# Patient Record
Sex: FEMALE | Race: WHITE | URBAN - METROPOLITAN AREA
[De-identification: names, ages, dates, MRNs, and addresses within clinical notes are randomized per-mention and may not be internally consistent; named-entity substitution may affect disease eponyms.]

---

## 2021-06-23 ENCOUNTER — LAB OUTSIDE AN ENCOUNTER (OUTPATIENT)
Dept: URBAN - METROPOLITAN AREA CLINIC 40 | Facility: CLINIC | Age: 66
End: 2021-06-23

## 2021-06-23 ENCOUNTER — OFFICE VISIT (OUTPATIENT)
Dept: URBAN - METROPOLITAN AREA CLINIC 40 | Facility: CLINIC | Age: 66
End: 2021-06-23
Payer: MEDICARE

## 2021-06-23 DIAGNOSIS — R19.7 DIARRHEA: ICD-10-CM

## 2021-06-23 DIAGNOSIS — R10.31 RLQ ABDOMINAL PAIN: ICD-10-CM

## 2021-06-23 PROCEDURE — 99204 OFFICE O/P NEW MOD 45 MIN: CPT | Performed by: INTERNAL MEDICINE

## 2021-06-23 RX ORDER — RISPERIDONE 1 MG/1
AS DIRECTED TABLET ORAL
Status: ACTIVE | COMMUNITY

## 2021-06-23 RX ORDER — DICYCLOMINE HYDROCHLORIDE 10 MG/1
AS DIRECTED CAPSULE ORAL
OUTPATIENT

## 2021-06-23 RX ORDER — SERTRALINE 25 MG/1
AS DIRECTED TABLET, FILM COATED ORAL
Status: ACTIVE | COMMUNITY

## 2021-06-23 RX ORDER — DICYCLOMINE HYDROCHLORIDE 10 MG/1
AS DIRECTED CAPSULE ORAL
Status: ACTIVE | COMMUNITY

## 2021-06-23 RX ORDER — METRONIDAZOLE 500 MG/1
AS DIRECTED TABLET ORAL
Status: ACTIVE | COMMUNITY

## 2021-06-23 RX ORDER — CLONAZEPAM 1 MG/1
AS DIRECTED TABLET ORAL
Status: ACTIVE | COMMUNITY

## 2021-06-23 RX ORDER — ESTROGENS, CONJUGATED 0.3 MG/1
AS DIRECTED TABLET, FILM COATED ORAL
Status: ACTIVE | COMMUNITY

## 2021-06-23 NOTE — HPI-TODAY'S VISIT:
Ms. Lombardo is a 66-year-old white female referred for new patient evaluation by Dr. Rachel Araiza.  Patient has a history of a bladder removal with ileal conduit after a complicated bladder sling surgery in 2014.  She states ever since the surgery she has had issues with fecal incontinence.  An attempt at colonoscopy in 2017 failed.  She states her last complete colonoscopy was 2012 where polyps were removed.  Currently patient is complaining of issues with abdominal discomfort as well as loose stools.  She states the stools are postprandial and 5-6 times a day.  They are occasionally nocturnal.  There is no blood in the stool.  She over the last 2 weeks has had some issues with nausea as well as feeling like the insides were coming out of her rectum.  She is ended up in the  [place] emergency room  june 5th due to the pain becoming severe on the left side and flank. CT scan was done. This showed alimentary canal images within normal limits.  The appendix was surgically absent.  No acute abdominal pelvic process was identified.  There was a left hip effusion noted.  Her urine was consistent with urinary tract infection and she has been on antibiotics for this.  She is not on any probiotics.  She was given dicyclomine by her primary care physician which she states has been helpful but she only takes once a day because of concern about being reliant on pain medications.  She is not noticing any bleeding out of her urostomy bag.  She does have a hernia at the urostomy site.  She notes a decreased appetite.  She also notes that her stool was dark last Wednesday.  She denies any NSAIDs.

## 2021-06-29 LAB
FECAL FAT, QUALITATIVE: (no result)
GASTROINTESTINAL PATHOGEN: (no result)
PANCREATICELASTASE ELISA, STOOL: (no result)
STOOL, WHITE BLOOD CELLS: (no result)

## 2021-07-01 ENCOUNTER — TELEPHONE ENCOUNTER (OUTPATIENT)
Dept: URBAN - METROPOLITAN AREA CLINIC 40 | Facility: CLINIC | Age: 66
End: 2021-07-01

## 2021-07-01 RX ORDER — COLESTIPOL HYDROCHLORIDE 1 G/1
1 TABLET TABLET, FILM COATED ORAL
Qty: 180 | Refills: 1 | OUTPATIENT
Start: 2021-07-01

## 2021-08-03 ENCOUNTER — OFFICE VISIT (OUTPATIENT)
Dept: URBAN - METROPOLITAN AREA CLINIC 40 | Facility: CLINIC | Age: 66
End: 2021-08-03
Payer: MEDICARE

## 2021-08-03 ENCOUNTER — WEB ENCOUNTER (OUTPATIENT)
Dept: URBAN - METROPOLITAN AREA CLINIC 40 | Facility: CLINIC | Age: 66
End: 2021-08-03

## 2021-08-03 ENCOUNTER — OFFICE VISIT (OUTPATIENT)
Dept: URBAN - METROPOLITAN AREA CLINIC 40 | Facility: CLINIC | Age: 66
End: 2021-08-03

## 2021-08-03 DIAGNOSIS — K46.9 PERISTOMAL HERNIA: ICD-10-CM

## 2021-08-03 DIAGNOSIS — R19.7 DIARRHEA: ICD-10-CM

## 2021-08-03 DIAGNOSIS — R93.3 ABNORMAL CT SCAN, COLON: ICD-10-CM

## 2021-08-03 DIAGNOSIS — K58.9 IBS (IRRITABLE BOWEL SYNDROME): ICD-10-CM

## 2021-08-03 PROCEDURE — 99214 OFFICE O/P EST MOD 30 MIN: CPT | Performed by: INTERNAL MEDICINE

## 2021-08-03 RX ORDER — RISPERIDONE 1 MG/1
AS DIRECTED TABLET ORAL
Status: ACTIVE | COMMUNITY

## 2021-08-03 RX ORDER — COLESTIPOL HYDROCHLORIDE 1 G/1
1 TABLET TABLET, FILM COATED ORAL
Qty: 180 | Refills: 1 | Status: ACTIVE | COMMUNITY
Start: 2021-07-01

## 2021-08-03 RX ORDER — ESTROGENS, CONJUGATED 0.3 MG/1
AS DIRECTED TABLET, FILM COATED ORAL
Status: ACTIVE | COMMUNITY

## 2021-08-03 RX ORDER — SERTRALINE 25 MG/1
AS DIRECTED TABLET, FILM COATED ORAL
Status: ACTIVE | COMMUNITY

## 2021-08-03 RX ORDER — COLESTIPOL HYDROCHLORIDE 1 G/1
2 TABLETS TABLET, FILM COATED ORAL
Qty: 360 | Refills: 1 | OUTPATIENT
Start: 2021-08-03

## 2021-08-03 RX ORDER — CLONAZEPAM 1 MG/1
AS DIRECTED TABLET ORAL
Status: ACTIVE | COMMUNITY

## 2021-08-03 RX ORDER — RIFAXIMIN 550 MG/1
1 TABLET TABLET ORAL THREE TIMES A DAY
Qty: 42 TABLET | Refills: 0 | OUTPATIENT
Start: 2021-08-03 | End: 2021-08-17

## 2021-08-03 RX ORDER — COLESTIPOL HYDROCHLORIDE 1 G/1
1 TABLET TABLET, FILM COATED ORAL
OUTPATIENT
Start: 2021-07-01

## 2021-08-03 RX ORDER — DICYCLOMINE HYDROCHLORIDE 10 MG/1
AS DIRECTED CAPSULE ORAL
Status: ACTIVE | COMMUNITY

## 2021-08-03 RX ORDER — METRONIDAZOLE 500 MG/1
AS DIRECTED TABLET ORAL
Status: ACTIVE | COMMUNITY

## 2021-08-03 NOTE — PHYSICAL EXAM GASTROINTESTINAL
Abdomen , soft,  mild RLQ,  ostomy in place c/d/i,  nondistended , no guarding or rigidity , no masses palpable , normal bowel sounds , Liver and Spleen , no hepatomegaly present , no hepatosplenomegaly , liver nontender , spleen not palpable

## 2021-08-03 NOTE — HPI-TODAY'S VISIT:
Ms. Lombardo present to clinic for follow-up.  She was initially seen on June 23.  She was complaining of abdominal pain as well as significant diarrhea.  Recall patient has a history of bladder cancer status post diverting ureterostomy.  We started her work-up with stool studies and a CT enterography.  Stool studies in June were unremarkable for infection or absorption issues.  CT enterography on July 20 that showed a large parastomal hernia that contained bowel and had high risk of strangulation.  The rest of the stomach and intestines appeared normal.  Patient was started on colestipol 1 g twice daily.  She states she noted minimal improvement.  She is  still going up to 8 times a day.  She is  still having some discomfort around the stoma.  She was given dicyclomine.  This helped somewhat.

## 2021-09-22 ENCOUNTER — OFFICE VISIT (OUTPATIENT)
Dept: URBAN - METROPOLITAN AREA CLINIC 40 | Facility: CLINIC | Age: 66
End: 2021-09-22

## 2021-09-22 RX ORDER — ESTROGENS, CONJUGATED 0.3 MG/1
AS DIRECTED TABLET, FILM COATED ORAL
Status: ACTIVE | COMMUNITY

## 2021-09-22 RX ORDER — DICYCLOMINE HYDROCHLORIDE 10 MG/1
AS DIRECTED CAPSULE ORAL
Status: ACTIVE | COMMUNITY

## 2021-09-22 RX ORDER — RISPERIDONE 1 MG/1
AS DIRECTED TABLET ORAL
Status: ACTIVE | COMMUNITY

## 2021-09-22 RX ORDER — METRONIDAZOLE 500 MG/1
AS DIRECTED TABLET ORAL
Status: ACTIVE | COMMUNITY

## 2021-09-22 RX ORDER — SERTRALINE 25 MG/1
AS DIRECTED TABLET, FILM COATED ORAL
Status: ACTIVE | COMMUNITY

## 2021-09-22 RX ORDER — CLONAZEPAM 1 MG/1
AS DIRECTED TABLET ORAL
Status: ACTIVE | COMMUNITY

## 2021-09-22 RX ORDER — COLESTIPOL HYDROCHLORIDE 1 G/1
2 TABLETS TABLET, FILM COATED ORAL
Qty: 360 | Refills: 1 | Status: ACTIVE | COMMUNITY
Start: 2021-08-03

## 2021-10-11 ENCOUNTER — OFFICE VISIT (OUTPATIENT)
Dept: URBAN - METROPOLITAN AREA CLINIC 40 | Facility: CLINIC | Age: 66
End: 2021-10-11

## 2021-11-23 ENCOUNTER — OFFICE VISIT (OUTPATIENT)
Dept: URBAN - METROPOLITAN AREA CLINIC 40 | Facility: CLINIC | Age: 66
End: 2021-11-23
Payer: MEDICARE

## 2021-11-23 DIAGNOSIS — K58.9 IBS (IRRITABLE BOWEL SYNDROME): ICD-10-CM

## 2021-11-23 DIAGNOSIS — K46.9 PERISTOMAL HERNIA: ICD-10-CM

## 2021-11-23 PROCEDURE — 99214 OFFICE O/P EST MOD 30 MIN: CPT | Performed by: INTERNAL MEDICINE

## 2021-11-23 RX ORDER — ESTROGENS, CONJUGATED 0.3 MG/1
AS DIRECTED TABLET, FILM COATED ORAL
Status: ACTIVE | COMMUNITY

## 2021-11-23 RX ORDER — RIFAXIMIN 550 MG/1
1 TABLET TABLET ORAL THREE TIMES A DAY
Qty: 42 TABLET | Refills: 0
Start: 2021-08-03 | End: 2021-12-07

## 2021-11-23 RX ORDER — CLONAZEPAM 1 MG/1
AS DIRECTED TABLET ORAL
Status: ACTIVE | COMMUNITY

## 2021-11-23 RX ORDER — COLESTIPOL HYDROCHLORIDE 1 G/1
2 TABLETS TABLET, FILM COATED ORAL
Qty: 360 | Refills: 1 | OUTPATIENT
Start: 2021-11-23

## 2021-11-23 RX ORDER — METRONIDAZOLE 500 MG/1
AS DIRECTED TABLET ORAL
Status: DISCONTINUED | COMMUNITY

## 2021-11-23 RX ORDER — SERTRALINE 25 MG/1
AS DIRECTED TABLET, FILM COATED ORAL
Status: ACTIVE | COMMUNITY

## 2021-11-23 RX ORDER — RISPERIDONE 1 MG/1
AS DIRECTED TABLET ORAL
Status: DISCONTINUED | COMMUNITY

## 2021-11-23 RX ORDER — COLESTIPOL HYDROCHLORIDE 1 G/1
2 TABLETS TABLET, FILM COATED ORAL
Qty: 360 | Refills: 1 | Status: DISCONTINUED | COMMUNITY
Start: 2021-08-03

## 2021-11-23 RX ORDER — DICYCLOMINE HYDROCHLORIDE 10 MG/1
AS DIRECTED CAPSULE ORAL
Status: ACTIVE | COMMUNITY

## 2021-11-23 NOTE — PHYSICAL EXAM GASTROINTESTINAL
Abdomen , soft, mild periumbilical tenderness, nondistended , no guarding or rigidity , no masses palpable , normal bowel sounds , Liver and Spleen , no hepatomegaly present , no hepatosplenomegaly , liver nontender , spleen not palpable, Ostomy present RLQ

## 2021-11-23 NOTE — HPI-TODAY'S VISIT:
Ms. Lombardo presents to clinic for follow-up.  She was last seen in August.  That appointment we went over the fact that her CT enterography showed a very large parastomal hernia with high risk of obstruction.  She was sent to Dr. Dragan Chino at [facility] for repair.  The repair was performed on September 29 with placement of mesh.  This was complicated by a repeat hospitalization at [facility] for an ileus and her stoma retracting and seeping fluid.  Urology has seen her and is planning to follow-up with that.  At her last appointment we increased her colestipol from 1 tablet twice daily to 2 tablets twice daily.  She states 2 days after her surgery she started to have issues with diarrhea again.  She states the stool is watery and explosive.  Every time she eats she will have a bowel movement.  There is no blood in the stool.  She has had a CT scan on November 18 through [facility] that shows a small parastomal hernia with a small amount of cecum present in the hernia.

## 2022-01-05 ENCOUNTER — OFFICE VISIT (OUTPATIENT)
Dept: URBAN - METROPOLITAN AREA CLINIC 40 | Facility: CLINIC | Age: 67
End: 2022-01-05
Payer: MEDICARE

## 2022-01-05 DIAGNOSIS — R10.31 RLQ ABDOMINAL PAIN: ICD-10-CM

## 2022-01-05 DIAGNOSIS — R19.7 DIARRHEA: ICD-10-CM

## 2022-01-05 DIAGNOSIS — K58.9 IBS (IRRITABLE BOWEL SYNDROME): ICD-10-CM

## 2022-01-05 PROBLEM — 10743008 IRRITABLE BOWEL SYNDROME: Status: ACTIVE | Noted: 2021-08-03

## 2022-01-05 PROCEDURE — 99214 OFFICE O/P EST MOD 30 MIN: CPT | Performed by: INTERNAL MEDICINE

## 2022-01-05 RX ORDER — ESTROGENS, CONJUGATED 0.3 MG/1
AS DIRECTED TABLET, FILM COATED ORAL
Status: ACTIVE | COMMUNITY

## 2022-01-05 RX ORDER — DICYCLOMINE HYDROCHLORIDE 20 MG/1
1 TABLET TABLET ORAL
Qty: 120 TABLET | Refills: 0 | OUTPATIENT
Start: 2022-01-05 | End: 2022-02-04

## 2022-01-05 RX ORDER — DICYCLOMINE HYDROCHLORIDE 10 MG/1
AS DIRECTED CAPSULE ORAL
OUTPATIENT

## 2022-01-05 RX ORDER — COLESTIPOL HYDROCHLORIDE 1 G/1
2 TABLETS TABLET, FILM COATED ORAL
OUTPATIENT
Start: 2021-11-23

## 2022-01-05 RX ORDER — COLESTIPOL HYDROCHLORIDE 1 G/1
2 TABLETS TABLET, FILM COATED ORAL
Qty: 360 | Refills: 1 | Status: ACTIVE | COMMUNITY
Start: 2021-11-23

## 2022-01-05 RX ORDER — CLONAZEPAM 1 MG/1
AS DIRECTED TABLET ORAL
Status: ACTIVE | COMMUNITY

## 2022-01-05 RX ORDER — DICYCLOMINE HYDROCHLORIDE 10 MG/1
AS DIRECTED CAPSULE ORAL
Status: ACTIVE | COMMUNITY

## 2022-01-05 RX ORDER — SERTRALINE 25 MG/1
AS DIRECTED TABLET, FILM COATED ORAL
Status: ACTIVE | COMMUNITY

## 2022-01-05 NOTE — PHYSICAL EXAM GASTROINTESTINAL
Abdomen , soft, moderate BLQ abdominal tenderness, nondistended , no guarding or rigidity , no masses palpable , normal bowel sounds , Liver and Spleen , no hepatomegaly present , no hepatosplenomegaly , liver nontender , spleen not palpable, urostomy present RLQ

## 2022-01-05 NOTE — HPI-TODAY'S VISIT:
Ms. Lombardo presents to clinic for follow-up.  She was last seen in November.  At that appointment she was continued to have issues with loose stools.  We increased her colestipol to 2 g twice daily.  We attempted to get Xifaxan approved.  She states she got a notification from [facility] where the medication will be sent but about $700 was still present.  [facility] is apparently working to see if there are any patient assistance that she can qualify for.  She has noted some improvement in going to 2 g twice daily on the colestipol.  She notes that she is going less frequently although the stools are still very watery.  She still notes issues with chronic nausea since her surgery with Dr. Peace.  She seen urology who is referred her to [facility] for a second opinion for general surgery as well as urology as they feel extensive abdominal wall reconstruction is going to be needed.  Patient notes abdominal discomfort around the stoma.  There is a burning discomfort.  She notes abdominal spasm as well.

## 2022-01-10 ENCOUNTER — P2P PATIENT RECORD (OUTPATIENT)
Age: 67
End: 2022-01-10

## 2022-03-08 ENCOUNTER — OFFICE VISIT (OUTPATIENT)
Dept: URBAN - METROPOLITAN AREA CLINIC 40 | Facility: CLINIC | Age: 67
End: 2022-03-08
Payer: MEDICARE

## 2022-03-08 DIAGNOSIS — R10.31 RLQ ABDOMINAL PAIN: ICD-10-CM

## 2022-03-08 DIAGNOSIS — K46.9 PERISTOMAL HERNIA: ICD-10-CM

## 2022-03-08 PROCEDURE — 99213 OFFICE O/P EST LOW 20 MIN: CPT | Performed by: INTERNAL MEDICINE

## 2022-03-08 RX ORDER — COLESTIPOL HYDROCHLORIDE 1 G/1
2 TABLETS TABLET, FILM COATED ORAL
Status: DISCONTINUED | COMMUNITY
Start: 2021-11-23

## 2022-03-08 RX ORDER — CLONAZEPAM 1 MG/1
AS DIRECTED TABLET ORAL
Status: ACTIVE | COMMUNITY

## 2022-03-08 RX ORDER — SERTRALINE 25 MG/1
AS DIRECTED TABLET, FILM COATED ORAL
Status: ACTIVE | COMMUNITY

## 2022-03-08 RX ORDER — ESTROGENS, CONJUGATED 0.3 MG/1
AS DIRECTED TABLET, FILM COATED ORAL
Status: ACTIVE | COMMUNITY

## 2022-03-08 NOTE — PHYSICAL EXAM GASTROINTESTINAL
Abdomen , soft, diffuse abdominal tenderness , nondistended , no guarding or rigidity , no masses palpable , normal bowel sounds , Liver and Spleen , no hepatomegaly present , no hepatosplenomegaly , liver nontender , spleen not palpable, ileal conduit  RLQ

## 2022-03-08 NOTE — HPI-TODAY'S VISIT:
Ms. Lombardo presents to clinic for follow-up.  Last seen in January that appointment prior to her Xifaxan due to her abdominal discomfort and bloating.  She was also increased on her dicyclomine due to abdominal pain.  Patient states that she has seen urology at [facility].  They have been hesitant to do any further surgery and are thinking of sending her either to [facility] or the [facility] [place] [facility] due to the complex nature of her anatomy.  She has been on Darvocet for her pain  due to the hernia near her stoma.  This actually caused her to be constipated so she stopped the colestipol.  She apparently was not approved for the Xifaxan so did not try this.  She also stopped the dicyclomine.  She does note nausea when she has pain.  Because of her fear of addiction to the pain medication she is only taking it every few days.  This tends to start a vicious cycle with nausea and discomfort until she takes enough pain medication to get her symptoms under control.

## 2022-03-10 ENCOUNTER — TELEPHONE ENCOUNTER (OUTPATIENT)
Dept: URBAN - METROPOLITAN AREA CLINIC 40 | Facility: CLINIC | Age: 67
End: 2022-03-10

## 2022-07-12 ENCOUNTER — TELEPHONE ENCOUNTER (OUTPATIENT)
Dept: URBAN - METROPOLITAN AREA CLINIC 40 | Facility: CLINIC | Age: 67
End: 2022-07-12

## 2022-09-06 ENCOUNTER — OFFICE VISIT (OUTPATIENT)
Dept: URBAN - METROPOLITAN AREA CLINIC 40 | Facility: CLINIC | Age: 67
End: 2022-09-06

## 2022-09-06 ENCOUNTER — DASHBOARD ENCOUNTERS (OUTPATIENT)
Age: 67
End: 2022-09-06

## 2022-09-06 RX ORDER — CLONAZEPAM 1 MG/1
AS DIRECTED TABLET ORAL
Status: ACTIVE | COMMUNITY

## 2022-09-06 RX ORDER — ESTROGENS, CONJUGATED 0.3 MG/1
AS DIRECTED TABLET, FILM COATED ORAL
Status: ACTIVE | COMMUNITY

## 2022-09-06 RX ORDER — SERTRALINE 25 MG/1
AS DIRECTED TABLET, FILM COATED ORAL
Status: ACTIVE | COMMUNITY

## 2025-02-27 ENCOUNTER — OFFICE VISIT (OUTPATIENT)
Dept: URBAN - METROPOLITAN AREA CLINIC 40 | Facility: CLINIC | Age: 70
End: 2025-02-27
Payer: MEDICARE

## 2025-02-27 VITALS
WEIGHT: 132.6 LBS | HEIGHT: 62 IN | SYSTOLIC BLOOD PRESSURE: 118 MMHG | TEMPERATURE: 97.6 F | DIASTOLIC BLOOD PRESSURE: 68 MMHG | HEART RATE: 64 BPM | BODY MASS INDEX: 24.4 KG/M2

## 2025-02-27 DIAGNOSIS — R13.19 ESOPHAGEAL DYSPHAGIA: ICD-10-CM

## 2025-02-27 DIAGNOSIS — K59.03 DRUG INDUCED CONSTIPATION: ICD-10-CM

## 2025-02-27 DIAGNOSIS — T40.2X5A ADVERSE EFFECT OF OTHER OPIOIDS, INITIAL ENCOUNTER: ICD-10-CM

## 2025-02-27 DIAGNOSIS — G89.29 OTHER CHRONIC PAIN: ICD-10-CM

## 2025-02-27 DIAGNOSIS — R10.9 UNSPECIFIED ABDOMINAL PAIN: ICD-10-CM

## 2025-02-27 DIAGNOSIS — R49.0 HOARSENESS: ICD-10-CM

## 2025-02-27 PROBLEM — 82423001: Status: ACTIVE | Noted: 2025-02-27

## 2025-02-27 PROCEDURE — 99214 OFFICE O/P EST MOD 30 MIN: CPT

## 2025-02-27 RX ORDER — ESTRADIOL 0.5 MG/1
TABLET ORAL
Qty: 90 TABLET | Status: DISCONTINUED | COMMUNITY

## 2025-02-27 RX ORDER — ATORVASTATIN CALCIUM 80 MG/1
TABLET, FILM COATED ORAL
Qty: 30 TABLET | Status: ACTIVE | COMMUNITY

## 2025-02-27 RX ORDER — METOPROLOL TARTRATE 25 MG/1
TABLET, FILM COATED ORAL
Qty: 60 TABLET | Status: ACTIVE | COMMUNITY

## 2025-02-27 RX ORDER — ESTRADIOL 0.5 MG/1
TABLET ORAL
Qty: 90 TABLET | Status: ACTIVE | COMMUNITY

## 2025-02-27 RX ORDER — POTASSIUM CHLORIDE 750 MG/1
TAKE 1 TABLET BY MOUTH ONCE DAILY WITH LASIX DO NOT CRUSH OR CHEW TABLET, EXTENDED RELEASE ORAL
Qty: 20 EACH | Refills: 0 | Status: ACTIVE | COMMUNITY

## 2025-02-27 RX ORDER — ROPINIROLE 2 MG/1
TABLET, FILM COATED ORAL
Qty: 90 TABLET | Status: DISCONTINUED | COMMUNITY

## 2025-02-27 RX ORDER — FUROSEMIDE 20 MG/1
TABLET ORAL
Qty: 20 TABLET | Status: ACTIVE | COMMUNITY

## 2025-02-27 RX ORDER — ESTROGENS, CONJUGATED 0.3 MG/1
AS DIRECTED TABLET, FILM COATED ORAL
Status: ACTIVE | COMMUNITY

## 2025-02-27 RX ORDER — ALPRAZOLAM 0.5 MG/1
TAKE 1 TABLET BY MOUTH ONCE DAILY AS NEEDED FOR ANXIETY TABLET ORAL
Qty: 30 EACH | Refills: 1 | Status: ACTIVE | COMMUNITY

## 2025-02-27 RX ORDER — CYANOCOBALAMIN 1000 UG/ML
INJECT 1 ML INTO THE SHOULDER, THIGH OR BUTTOCKS EVERY 14 DAYS INJECTION INTRAMUSCULAR; SUBCUTANEOUS
Qty: 2 MILLILITER | Refills: 4 | Status: ACTIVE | COMMUNITY

## 2025-02-27 RX ORDER — PRIMIDONE 50 MG/1
TABLET ORAL
Qty: 180 TABLET | Status: DISCONTINUED | COMMUNITY

## 2025-02-27 RX ORDER — CLONAZEPAM 1 MG/1
AS DIRECTED TABLET ORAL
Status: ACTIVE | COMMUNITY

## 2025-02-27 RX ORDER — PRIMIDONE 50 MG/1
TABLET ORAL
Qty: 180 TABLET | Status: ACTIVE | COMMUNITY

## 2025-02-27 RX ORDER — ALPRAZOLAM 0.5 MG/1
TABLET ORAL
Qty: 30 TABLET | Status: ACTIVE | COMMUNITY

## 2025-02-27 RX ORDER — NALOXEGOL OXALATE 12.5 MG/1
1 TABLET IN THE MORNING TABLET, FILM COATED ORAL ONCE A DAY
Qty: 30 | OUTPATIENT
Start: 2025-02-27 | End: 2025-03-29

## 2025-02-27 RX ORDER — SERTRALINE 25 MG/1
AS DIRECTED TABLET, FILM COATED ORAL
Status: ACTIVE | COMMUNITY

## 2025-02-27 RX ORDER — FENTANYL 50 UG/H
PATCH TRANSDERMAL
Qty: 5 PATCH | Status: DISCONTINUED | COMMUNITY

## 2025-02-27 RX ORDER — FENTANYL 50 UG/H
PATCH TRANSDERMAL
Qty: 5 PATCH | Status: ACTIVE | COMMUNITY

## 2025-02-27 NOTE — PHYSICAL EXAM GASTROINTESTINAL
Abdomen , soft, nontender, nondistended , no guarding or rigidity , no masses palpable , normal bowel sounds , Liver and Spleen,  no hepatosplenomegaly , liver nontender. Urostomy in place.

## 2025-02-27 NOTE — HPI-TODAY'S VISIT:
Patient last seen in 2022. Known to Dr. Hankins. She has a complicated urologic/GI history including mesh urethral sling placement in 2014 that ultimately eroded.  She has undergone a simple cystectomy with ileal conduit in 2016 with stomal revision in 2019.  She is also previously undergone previous parastomal repair and repair of incisional hernia with mesh. Since her last visit she was hospitalized in 2022 with a bowel obstruction.  She had an exploratory laparotomy on September 30, 2022 and was found to have a closed loop bowel obstruction which was not  able to be repaired. Has chronic abdominal adhesions from about 30 abdominal surgeries per patient. She was sent home on hospice.   Patient reports doing well since 2022. Is on methadone, fentanyl, and oxycodone. Has extreme constipation with daily Senna and one BM per week. Patient has never tried medications for OIC. She does report hx of colon polyps but no colonoscopy since chronic bowel issues. Last colonoscopy attempted in [place] and she was advised by gastroenterologist no further colonoscopies.   She presents today with main complaint of 6 months of dysphagia to solids, liquids, pills. Hoarseness. No acid reflux, heartburn, nausea, vomiting. No impactions. She denies any new abdominal pain (has chronic), no nausea, vomiting. No previous EGD.  Patient has hx of CHF. Recent normal stress test per patient. Follows with [facility].

## 2025-02-28 ENCOUNTER — LAB OUTSIDE AN ENCOUNTER (OUTPATIENT)
Dept: URBAN - METROPOLITAN AREA CLINIC 40 | Facility: CLINIC | Age: 70
End: 2025-02-28

## 2025-04-18 ENCOUNTER — CLAIMS CREATED FROM THE CLAIM WINDOW (OUTPATIENT)
Dept: URBAN - METROPOLITAN AREA SURGERY CENTER 30 | Facility: SURGERY CENTER | Age: 70
End: 2025-04-18
Payer: MEDICARE

## 2025-04-18 ENCOUNTER — CLAIMS CREATED FROM THE CLAIM WINDOW (OUTPATIENT)
Dept: URBAN - METROPOLITAN AREA CLINIC 4 | Facility: CLINIC | Age: 70
End: 2025-04-18
Payer: MEDICARE

## 2025-04-18 DIAGNOSIS — R13.19 CERVICAL DYSPHAGIA: ICD-10-CM

## 2025-04-18 DIAGNOSIS — K29.70 GASTRITIS WITHOUT BLEEDING, UNSPECIFIED CHRONICITY, UNSPECIFIED GASTRITIS TYPE: ICD-10-CM

## 2025-04-18 DIAGNOSIS — R10.13 ABDOMINAL DISCOMFORT, EPIGASTRIC: ICD-10-CM

## 2025-04-18 DIAGNOSIS — K21.9 GASTRO-ESOPHAGEAL REFLUX DISEASE WITHOUT ESOPHAGITIS: ICD-10-CM

## 2025-04-18 DIAGNOSIS — K29.70 GASTRITIS, UNSPECIFIED, WITHOUT BLEEDING: ICD-10-CM

## 2025-04-18 DIAGNOSIS — K21.9 ACID REFLUX: ICD-10-CM

## 2025-04-18 DIAGNOSIS — K31.89 OTHER DISEASES OF STOMACH AND DUODENUM: ICD-10-CM

## 2025-04-18 PROCEDURE — 43450 DILATE ESOPHAGUS 1/MULT PASS: CPT | Performed by: INTERNAL MEDICINE

## 2025-04-18 PROCEDURE — 43239 EGD BIOPSY SINGLE/MULTIPLE: CPT | Performed by: INTERNAL MEDICINE

## 2025-04-18 PROCEDURE — 88312 SPECIAL STAINS GROUP 1: CPT | Performed by: PATHOLOGY

## 2025-04-18 PROCEDURE — 88305 TISSUE EXAM BY PATHOLOGIST: CPT | Performed by: PATHOLOGY

## 2025-04-18 PROCEDURE — 43450 DILATE ESOPHAGUS 1/MULT PASS: CPT | Performed by: CLINIC/CENTER

## 2025-04-18 PROCEDURE — 43239 EGD BIOPSY SINGLE/MULTIPLE: CPT | Performed by: CLINIC/CENTER

## 2025-04-18 PROCEDURE — 00731 ANES UPR GI NDSC PX NOS: CPT | Performed by: NURSE ANESTHETIST, CERTIFIED REGISTERED

## 2025-04-18 RX ORDER — ATORVASTATIN CALCIUM 80 MG/1
TABLET, FILM COATED ORAL
Qty: 30 TABLET | Status: ACTIVE | COMMUNITY

## 2025-04-18 RX ORDER — POTASSIUM CHLORIDE 750 MG/1
TAKE 1 TABLET BY MOUTH ONCE DAILY WITH LASIX DO NOT CRUSH OR CHEW TABLET, EXTENDED RELEASE ORAL
Qty: 20 EACH | Refills: 0 | Status: ACTIVE | COMMUNITY

## 2025-04-18 RX ORDER — FUROSEMIDE 20 MG/1
TABLET ORAL
Qty: 20 TABLET | Status: ACTIVE | COMMUNITY

## 2025-04-18 RX ORDER — ALPRAZOLAM 0.5 MG/1
TAKE 1 TABLET BY MOUTH ONCE DAILY AS NEEDED FOR ANXIETY TABLET ORAL
Qty: 30 EACH | Refills: 1 | Status: ACTIVE | COMMUNITY

## 2025-04-18 RX ORDER — SERTRALINE 25 MG/1
AS DIRECTED TABLET, FILM COATED ORAL
Status: ACTIVE | COMMUNITY

## 2025-04-18 RX ORDER — PRIMIDONE 50 MG/1
TABLET ORAL
Qty: 180 TABLET | Status: ACTIVE | COMMUNITY

## 2025-04-18 RX ORDER — CYANOCOBALAMIN 1000 UG/ML
INJECT 1 ML INTO THE SHOULDER, THIGH OR BUTTOCKS EVERY 14 DAYS INJECTION INTRAMUSCULAR; SUBCUTANEOUS
Qty: 2 MILLILITER | Refills: 4 | Status: ACTIVE | COMMUNITY

## 2025-04-18 RX ORDER — FENTANYL 50 UG/H
PATCH TRANSDERMAL
Qty: 5 PATCH | Status: ACTIVE | COMMUNITY

## 2025-04-18 RX ORDER — ESTRADIOL 0.5 MG/1
TABLET ORAL
Qty: 90 TABLET | Status: ACTIVE | COMMUNITY

## 2025-04-18 RX ORDER — ALPRAZOLAM 0.5 MG/1
TABLET ORAL
Qty: 30 TABLET | Status: ACTIVE | COMMUNITY

## 2025-04-18 RX ORDER — METOPROLOL TARTRATE 25 MG/1
TABLET, FILM COATED ORAL
Qty: 60 TABLET | Status: ACTIVE | COMMUNITY

## 2025-04-18 RX ORDER — CLONAZEPAM 1 MG/1
AS DIRECTED TABLET ORAL
Status: ACTIVE | COMMUNITY

## 2025-04-18 RX ORDER — ESTROGENS, CONJUGATED 0.3 MG/1
AS DIRECTED TABLET, FILM COATED ORAL
Status: ACTIVE | COMMUNITY

## 2025-05-21 ENCOUNTER — OFFICE VISIT (OUTPATIENT)
Dept: URBAN - METROPOLITAN AREA CLINIC 40 | Facility: CLINIC | Age: 70
End: 2025-05-21
Payer: MEDICARE

## 2025-05-21 DIAGNOSIS — K21.9 GERD: ICD-10-CM

## 2025-05-21 DIAGNOSIS — K59.03 CONSTIPATION DUE TO OPIOID THERAPY: ICD-10-CM

## 2025-05-21 DIAGNOSIS — Z90.49 HISTORY OF CHOLECYSTECTOMY: ICD-10-CM

## 2025-05-21 DIAGNOSIS — K83.8 DILATED BILE DUCT: ICD-10-CM

## 2025-05-21 DIAGNOSIS — K29.70 GASTRITIS: ICD-10-CM

## 2025-05-21 PROCEDURE — 99213 OFFICE O/P EST LOW 20 MIN: CPT | Performed by: PHYSICIAN ASSISTANT

## 2025-05-21 RX ORDER — ESTROGENS, CONJUGATED 0.3 MG/1
AS DIRECTED TABLET, FILM COATED ORAL
Status: ACTIVE | COMMUNITY

## 2025-05-21 RX ORDER — CLONAZEPAM 1 MG/1
AS DIRECTED TABLET ORAL
Status: ON HOLD | COMMUNITY

## 2025-05-21 RX ORDER — ATORVASTATIN CALCIUM 80 MG/1
TABLET, FILM COATED ORAL
Qty: 30 TABLET | Status: ON HOLD | COMMUNITY

## 2025-05-21 RX ORDER — SERTRALINE 25 MG/1
AS DIRECTED TABLET, FILM COATED ORAL
Status: ON HOLD | COMMUNITY

## 2025-05-21 RX ORDER — OMEPRAZOLE 40 MG/1
1 CAPSULE 30 MINUTES BEFORE MORNING MEAL CAPSULE, DELAYED RELEASE ORAL ONCE A DAY
Status: ACTIVE | COMMUNITY

## 2025-05-21 RX ORDER — CYANOCOBALAMIN 1000 UG/ML
INJECT 1 ML INTO THE SHOULDER, THIGH OR BUTTOCKS EVERY 14 DAYS INJECTION INTRAMUSCULAR; SUBCUTANEOUS
Qty: 2 MILLILITER | Refills: 4 | Status: ON HOLD | COMMUNITY

## 2025-05-21 RX ORDER — ALPRAZOLAM 0.5 MG/1
TAKE 1 TABLET BY MOUTH ONCE DAILY AS NEEDED FOR ANXIETY TABLET ORAL
Qty: 30 EACH | Refills: 1 | Status: ON HOLD | COMMUNITY

## 2025-05-21 RX ORDER — ALPRAZOLAM 0.5 MG/1
TABLET ORAL
Qty: 30 TABLET | Status: ON HOLD | COMMUNITY

## 2025-05-21 RX ORDER — FENTANYL 50 UG/H
PATCH TRANSDERMAL
Qty: 5 PATCH | Status: ACTIVE | COMMUNITY

## 2025-05-21 RX ORDER — POTASSIUM CHLORIDE 750 MG/1
TAKE 1 TABLET BY MOUTH ONCE DAILY WITH LASIX DO NOT CRUSH OR CHEW TABLET, EXTENDED RELEASE ORAL
Qty: 20 EACH | Refills: 0 | Status: ON HOLD | COMMUNITY

## 2025-05-21 RX ORDER — FUROSEMIDE 20 MG/1
TABLET ORAL
Qty: 20 TABLET | Status: ON HOLD | COMMUNITY

## 2025-05-21 RX ORDER — PRIMIDONE 50 MG/1
TABLET ORAL
Qty: 180 TABLET | Status: ACTIVE | COMMUNITY

## 2025-05-21 RX ORDER — METOPROLOL TARTRATE 25 MG/1
TABLET, FILM COATED ORAL
Qty: 60 TABLET | Status: ON HOLD | COMMUNITY

## 2025-05-21 RX ORDER — ESTRADIOL 0.5 MG/1
TABLET ORAL
Qty: 90 TABLET | Status: ACTIVE | COMMUNITY

## 2025-05-21 NOTE — PHYSICAL EXAM GASTROINTESTINAL
Abdomen , soft, nontender, nondistended , no guarding or rigidity , no masses palpable , normal bowel sounds , Liver and Spleen,  no hepatosplenomegaly , liver nontender. Urostomy intact

## 2025-05-21 NOTE — HPI-TODAY'S VISIT:
Patient is overall doing well, using omeprazole daily but does not have any GERD symptoms nausea vomiting or dysphagia, swallowing improved since dilation.  However, she shows me paperwork from the ED visit recently with abdominal pain that has now resolved.  A CT abdomen and pelvis initially recommended follow-up imaging as her, bile duct was not dilated, postcholecystectomy.  An ultrasound confirmed this.  Additional findings included renal cyst.  On a final MRI abdomen without contrast, evidence of dilated bile duct of 1.2 cm which was dictated likely due to postcholecystectomy state.  No hepatic or pancreatic lesions seen.

## 2025-05-21 NOTE — HPI-TODAY'S VISIT:
Ms. Lombardo is a 69 year old White female who returns to office for follow up, last seen in office 2/27/25. She has a complicated urologic/GI history including mesh urethral sling placement in 2014 that ultimately eroded.  She has undergone a simple cystectomy with ileal conduit in 2016 with stomal revision in 2019.  She is also previously undergone previous parastomal repair and repair of incisional hernia with mesh. Since her last visit she was hospitalized in 2022 with a bowel obstruction.  She had an exploratory laparotomy on September 30, 2022 and was found to have a closed loop bowel obstruction which was not  able to be repaired. Has chronic abdominal adhesions from about 30 abdominal surgeries per patient. She was sent home on hospice.   Patient reports doing well since 2022. She is on methadone, fentanyl, and oxycodone. Has extreme constipation with daily Senna and one BM per week. Patient has never tried medications for OIC. She does report hx of colon polyps but no colonoscopy since chronic bowel issues. Last colonoscopy attempted in [place] and she was advised by gastroenterologist no further colonoscopies.   Patient has hx of CHF. Recent normal stress test per patient. Follows with [facility].  EGD 4/18/25 without findings to explain dysphagia, empirically dilated to 50Fr. Gastritis noted. No HP, per path. Reflux esophagitis without EoE, Quezada's with biopsies.

## 2025-05-22 LAB
ALBUMIN/GLOBULIN RATIO: 1.8
ALBUMIN: 4.3
ALKALINE PHOSPHATASE: 83
ALT (SGPT): 10
AST (SGOT): 14
BILIRUBIN, DIRECT: 0.1
BILIRUBIN, INDIRECT: 0.7
BILIRUBIN, TOTAL: 0.8
GLOBULIN: 2.4
PROTEIN, TOTAL: 6.7